# Patient Record
Sex: FEMALE | Race: WHITE
[De-identification: names, ages, dates, MRNs, and addresses within clinical notes are randomized per-mention and may not be internally consistent; named-entity substitution may affect disease eponyms.]

---

## 2023-01-05 PROBLEM — Z00.00 ENCOUNTER FOR PREVENTIVE HEALTH EXAMINATION: Status: ACTIVE | Noted: 2023-01-05

## 2023-01-12 ENCOUNTER — APPOINTMENT (OUTPATIENT)
Dept: PEDIATRIC ALLERGY IMMUNOLOGY | Facility: CLINIC | Age: 33
End: 2023-01-12
Payer: COMMERCIAL

## 2023-01-12 VITALS
WEIGHT: 119 LBS | HEIGHT: 61 IN | SYSTOLIC BLOOD PRESSURE: 100 MMHG | BODY MASS INDEX: 22.47 KG/M2 | DIASTOLIC BLOOD PRESSURE: 70 MMHG

## 2023-01-12 PROCEDURE — 99203 OFFICE O/P NEW LOW 30 MIN: CPT

## 2023-01-12 NOTE — HISTORY OF PRESENT ILLNESS
[None] : The patient is currently asymptomatic [de-identified] : BEKAH MACIAS is a 32 year  old female with a 10 year history of itchy throat, stomach pain and flushed and itchy skin after having crab. She took some Benadryl and avoided it ever since but ate other shellfish. She would get few hive pop ups in the years in between when having other shellfish. This past Mahin she had lobster and she had gastrointestinal issues as well as itchy throat going into the next day. SInce then every time she had sushi she would react. \par \par She also has history of seasonal nasal allergy symptoms, itchy eyes, itchy nose, itchy throat, congestion, runny nose and sneezing in the spring and fall. She gets especially effected by dogs. She has used OTC antihistamines with minimal relief. \par

## 2023-01-12 NOTE — ASSESSMENT
[FreeTextEntry1] : The patient is a 32 year female with a history suggestive of allergic rhinitis and shellfish allergy. I have ordered screening ImmunoCAP for various seafood and allergic rhinitis. I will evaluate in a week to assess.\par

## 2023-01-19 ENCOUNTER — APPOINTMENT (OUTPATIENT)
Dept: PEDIATRIC ALLERGY IMMUNOLOGY | Facility: CLINIC | Age: 33
End: 2023-01-19
Payer: COMMERCIAL

## 2023-01-19 VITALS
BODY MASS INDEX: 22.47 KG/M2 | SYSTOLIC BLOOD PRESSURE: 100 MMHG | DIASTOLIC BLOOD PRESSURE: 70 MMHG | HEIGHT: 61 IN | WEIGHT: 119 LBS

## 2023-01-19 DIAGNOSIS — J30.89 OTHER ALLERGIC RHINITIS: ICD-10-CM

## 2023-01-19 DIAGNOSIS — J30.2 OTHER ALLERGIC RHINITIS: ICD-10-CM

## 2023-01-19 DIAGNOSIS — T78.02XA ANAPHYLACTIC REACTION DUE TO SHELLFISH (CRUSTACEANS), INITIAL ENCOUNTER: ICD-10-CM

## 2023-01-19 DIAGNOSIS — T78.02XD ANAPHYLACTIC REACTION DUE TO SHELLFISH (CRUSTACEANS), SUBSEQUENT ENCOUNTER: ICD-10-CM

## 2023-01-19 PROCEDURE — 99213 OFFICE O/P EST LOW 20 MIN: CPT

## 2023-01-19 NOTE — HISTORY OF PRESENT ILLNESS
[None] : The patient is currently asymptomatic [de-identified] : BEKAH MACIAS is a 32 year  old female with a 10 year history of itchy throat, stomach pain and flushed and itchy skin after having crab. She took some Benadryl and avoided it ever since but ate other shellfish. She would get few hive pop ups in the years in between when having other shellfish. This past Mahin she had lobster and she had gastrointestinal issues as well as itchy throat going into the next day. SInce then every time she had sushi she would react. \par \par She also has history of seasonal nasal allergy symptoms, itchy eyes, itchy nose, itchy throat, congestion, runny nose and sneezing in the spring and fall. She gets especially effected by dogs. She has used OTC antihistamines with minimal relief. \par

## 2023-01-19 NOTE — ASSESSMENT
[FreeTextEntry1] : The patient is a 32 year female with a history of allergic rhinitis and shellfish allergy. screening ImmunoCAP for various seafood and environmental allergens showed positive reactions. I have discussed the importance of avoidance of shellfish and having an EpiPen at all times. I have also suggested Avoidance measures such as HEPA air purifier and dust mite proof encasings and symptomatic relief. I will see her in a year or as needed.

## 2023-01-19 NOTE — PHYSICAL EXAM
[Alert] : alert [Well Nourished] : well nourished [Healthy Appearance] : healthy appearance [No Acute Distress] : no acute distress [Well Developed] : well developed [Normal Pupil & Iris Size/Symmetry] : normal pupil and iris size and symmetry [No Discharge] : no discharge [No Photophobia] : no photophobia [Sclera Not Icteric] : sclera not icteric [Normal TMs] : both tympanic membranes were normal [Normal Nasal Mucosa] : the nasal mucosa was normal [Normal Lips/Tongue] : the lips and tongue were normal [Normal Outer Ear/Nose] : the ears and nose were normal in appearance [Normal Tonsils] : normal tonsils [Pale mucosa] : pale mucosa [No Thrush] : no thrush [Supple] : the neck was supple [Normal Rate and Effort] : normal respiratory rhythm and effort [No Crackles] : no crackles [No Retractions] : no retractions [Bilateral Audible Breath Sounds] : bilateral audible breath sounds [Normal Rate] : heart rate was normal  [Normal S1, S2] : normal S1 and S2 [No murmur] : no murmur [Regular Rhythm] : with a regular rhythm [Soft] : abdomen soft [Not Tender] : non-tender [Not Distended] : not distended [No HSM] : no hepato-splenomegaly [Normal Cervical Lymph Nodes] : cervical [Skin Intact] : skin intact  [No Rash] : no rash [No Skin Lesions] : no skin lesions [No clubbing] : no clubbing [No Edema] : no edema [No Cyanosis] : no cyanosis [Normal Mood] : mood was normal [Normal Affect] : affect was normal [Alert, Awake, Oriented as Age-Appropriate] : alert, awake, oriented as age appropriate

## 2023-02-01 RX ORDER — EPINEPHRINE 0.3 MG/.3ML
0.3 INJECTION, SOLUTION INTRAMUSCULAR
Qty: 1 | Refills: 0 | Status: ACTIVE | COMMUNITY
Start: 2023-02-01 | End: 1900-01-01